# Patient Record
Sex: FEMALE | URBAN - METROPOLITAN AREA
[De-identification: names, ages, dates, MRNs, and addresses within clinical notes are randomized per-mention and may not be internally consistent; named-entity substitution may affect disease eponyms.]

---

## 2020-10-05 ENCOUNTER — APPOINTMENT (RX ONLY)
Dept: URBAN - METROPOLITAN AREA CLINIC 23 | Facility: CLINIC | Age: 55
Setting detail: DERMATOLOGY
End: 2020-10-05

## 2020-10-05 VITALS — TEMPERATURE: 97.7 F

## 2020-10-05 DIAGNOSIS — Z41.9 ENCOUNTER FOR PROCEDURE FOR PURPOSES OTHER THAN REMEDYING HEALTH STATE, UNSPECIFIED: ICD-10-CM

## 2020-10-05 PROCEDURE — ? RECOMMENDATIONS

## 2020-10-05 NOTE — PROCEDURE: RECOMMENDATIONS
Recommendations (Free Text): Picoway laser :$350 spots or full face $700\\nCore laser : $4000 full face or $3000 for lower face \\nEmatrix : $500 each treatment or $1250 for 3 treatments
Detail Level: Detailed
Recommendation Preamble: The following recommendations were made during the visit:

## 2023-07-14 ENCOUNTER — APPOINTMENT (RX ONLY)
Dept: URBAN - METROPOLITAN AREA CLINIC 23 | Facility: CLINIC | Age: 58
Setting detail: DERMATOLOGY
End: 2023-07-14

## 2023-07-14 DIAGNOSIS — L80 VITILIGO: ICD-10-CM

## 2023-07-14 DIAGNOSIS — D22 MELANOCYTIC NEVI: ICD-10-CM

## 2023-07-14 DIAGNOSIS — L82.1 OTHER SEBORRHEIC KERATOSIS: ICD-10-CM

## 2023-07-14 DIAGNOSIS — D49.2 NEOPLASM OF UNSPECIFIED BEHAVIOR OF BONE, SOFT TISSUE, AND SKIN: ICD-10-CM

## 2023-07-14 DIAGNOSIS — L57.0 ACTINIC KERATOSIS: ICD-10-CM

## 2023-07-14 PROBLEM — D22.5 MELANOCYTIC NEVI OF TRUNK: Status: ACTIVE | Noted: 2023-07-14

## 2023-07-14 PROCEDURE — 11102 TANGNTL BX SKIN SINGLE LES: CPT

## 2023-07-14 PROCEDURE — ? IN-HOUSE DISPENSING PHARMACY

## 2023-07-14 PROCEDURE — ? COUNSELING

## 2023-07-14 PROCEDURE — 99213 OFFICE O/P EST LOW 20 MIN: CPT | Mod: 25

## 2023-07-14 PROCEDURE — ? RECOMMENDATIONS

## 2023-07-14 PROCEDURE — ? DIAGNOSIS COMMENT

## 2023-07-14 PROCEDURE — ? BIOPSY BY SHAVE METHOD

## 2023-07-14 PROCEDURE — ? ORDER FOR PHOTODYNAMIC THERAPY

## 2023-07-14 ASSESSMENT — LOCATION SIMPLE DESCRIPTION DERM
LOCATION SIMPLE: RIGHT ANKLE
LOCATION SIMPLE: RIGHT UPPER BACK
LOCATION SIMPLE: CHEST
LOCATION SIMPLE: RIGHT FOREHEAD
LOCATION SIMPLE: LEFT UPPER BACK

## 2023-07-14 ASSESSMENT — LOCATION DETAILED DESCRIPTION DERM
LOCATION DETAILED: LEFT INFERIOR MEDIAL UPPER BACK
LOCATION DETAILED: RIGHT SUPERIOR MEDIAL UPPER BACK
LOCATION DETAILED: RIGHT ANTERIOR MEDIAL MALLEOLUS
LOCATION DETAILED: RIGHT INFERIOR MEDIAL FOREHEAD
LOCATION DETAILED: LOWER STERNUM

## 2023-07-14 ASSESSMENT — LOCATION ZONE DERM
LOCATION ZONE: TRUNK
LOCATION ZONE: LEG
LOCATION ZONE: FACE

## 2023-07-14 NOTE — PROCEDURE: IN-HOUSE DISPENSING PHARMACY
Product 49 Refills: 0
Product 69 Unit Type: mg
Product 2 Amount/Unit (Numbers Only): 1
Product 15 Unit Type: tablets
Product 6 Unit Type: grams
Render Product Pricing In Note: No
Name Of Product 20: Apply pea size amount to entire face at bedtime only at bedtime only.
Product 11 Application Directions: Apply to the affected area once daily as indicated
Name Of Product 16: Latisse
Name Of Product 7: Latisse 3ml
Name Of Product 1: Tri-Bozena cream
Product 11 Unit Type: jar(s)
Product 7 Application Directions: Apply to each eye lashes at bedtime
Product 16 Application Directions: Apply to lashes at bedtime only
Name Of Product 12: Skin Better Even tone Serum
Product 5 Application Directions: Take one tablet by mouth before  procedure
Name Of Product 10: Valtrex 500mg
Product 21 Unit Type: kit(s)
Product 12 Refills: 2
Name Of Product 4: Prednisone 20mg
Product 19 Application Directions: Tretinoin 0.05%
Product 10 Application Directions: Take two tablets after procedure
Name Of Product 15: Viviscal PRO
Name Of Product 6: Tretinoin 0.05% cream
Detail Level: Detailed
Product 19 Unit Type: tube(s)
Product 10 Unit Type: bottle(s)
Product 13 Price/Unit (In Dollars): $10.00
Product 15 Application Directions: Take one tablet twice daily for 3-6 months
Product 6 Application Directions: Apply pea size amount to entire face at bedtime only.
Name Of Product 11: Hydroquinone 8 % pads
Product 15 Amount/Unit (Numbers Only): [facility]
Product 6 Amount/Unit (Numbers Only): [address]
Product 13 Application Directions: Apply to wound site every 48 hours as indicated.
Name Of Product 18: 5FU/calcipotriene
Product 4 Application Directions: Take 1 tablet today in office post injections  for swelling as indicated and one tablet tomorrow as needed for swelling.
Name Of Product 9: Brightening pads 6%
Product 13 Unit Type: unit(s)
Name Of Product 3: Prednisone 10 mg
Product 18 Application Directions: Apply to pre-cancer daily for 5- 7 days
Name Of Product 21: Defenage skincare
Product 9 Application Directions: Apply to affected area on the face  pm as indicated.
Product 1 Price/Unit (In Dollars): 0.00
Name Of Product 5: Valium 10mg
Product 3 Application Directions: Take 1 tablet For swelling as indicated today prior to procedure,and another tomorrow
Product 16 Amount/Unit (Numbers Only): 3
Product 21 Application Directions: Use as directed
Product 14 Application Directions: Take one tablet prior to procedure. Continue twice daily x 3 days as indicated.
Product 16 Unit Type: ml
Product 1 Application Directions: Apply to entire face at bedtime as indicated.
Product 14 Amount/Unit (Numbers Only): [address]
Product 12 Application Directions: Apply to the entire face in the Am and Pm
Name Of Product 17: Christine Kemp
Name Of Product 8: Valium 5 mg
Product 17 Application Directions: Apply thin layer to face at bedtime only.
Product 8 Application Directions: Take 1 tablet prior to procedure. Dispense in office.
Name Of Product 13: Duoderm thin
Product 2 Application Directions: Apply a pea size amount to Entire face at bedtime

## 2023-07-14 NOTE — PROCEDURE: RECOMMENDATIONS
Render Risk Assessment In Note?: no
Recommendations (Free Text): Consider PDT blue light treatment within next 6 months.  Start Tretinoin to entire face
Detail Level: Detailed
Recommendation Preamble: The following recommendations were made at today?s visit :

## 2023-07-14 NOTE — PROCEDURE: BIOPSY BY SHAVE METHOD
Detail Level: Detailed
Depth Of Biopsy: dermis
Was A Bandage Applied: Yes
Size Of Lesion In Cm: 0
Biopsy Type: H and E
Biopsy Method: 15 blade
Anesthesia Type: 1% lidocaine with epinephrine and a 1:10 solution of 8.4% sodium bicarbonate
Anesthesia Volume In Cc (Will Not Render If 0): 0.2
Hemostasis: Electrocautery
Wound Care: Vaseline
Dressing: bandage
Destruction After The Procedure: No
Type Of Destruction Used: Curettage
Curettage Text: The wound bed was treated with curettage after the biopsy was performed.
Cryotherapy Text: The wound bed was treated with cryotherapy after the biopsy was performed.
Electrodesiccation Text: The wound bed was treated with electrodesiccation after the biopsy was performed.
Electrodesiccation And Curettage Text: The wound bed was treated with electrodesiccation and curettage after the biopsy was performed.
Silver Nitrate Text: The wound bed was treated with silver nitrate after the biopsy was performed.
Lab: -[id number]
Consent: The provider's intent is to obtain a tissue sample solely for diagnostic purposes. Written consent to obtain tissue sample was obtained and risks were reviewed including but not limited to scarring, infection, bleeding, scabbing, incomplete removal, nerve damage and allergy to anesthesia.
Post-Care Instructions: I reviewed with the patient in detail post-care instructions. Patient is to keep the biopsy site dry overnight, and then apply bacitracin twice daily until healed. Patient may apply hydrogen peroxide soaks to remove any crusting.
Notification Instructions: Patient will be notified of biopsy results. However, patient instructed to call the office if not contacted within 2 weeks.
Billing Type: [employer]
Information: Selecting Yes will display possible errors in your note based on the variables you have selected. This validation is only offered as a suggestion for you. PLEASE NOTE THAT THE VALIDATION TEXT WILL BE REMOVED WHEN YOU FINALIZE YOUR NOTE. IF YOU WANT TO FAX A PRELIMINARY NOTE YOU WILL NEED TO TOGGLE THIS TO 'NO' IF YOU DO NOT WANT IT IN YOUR FAXED NOTE.

## 2023-07-14 NOTE — PROCEDURE: ORDER FOR PHOTODYNAMIC THERAPY
Location: Face
Detail Level: Zone
Arms Incubation Time: 2 Hours
Occlusion: No
Scalp Incubation Time: 90 mins
Neck Incubation Time: 1 Hour
Pdt Type: LANCE-U
Photosensitizer: Levulan
Face Incubation Time: 1 Hour 30 minutes
Consent: The procedure and risks were reviewed with the patient including but not limited to: burning, pigmentary changes, pain, blistering, scabbing, redness, and the possibility of needing numerous treatments. Strict photoprotection after the procedure was also discussed.
Face And Scalp Incubation Time: 90 minutes for the face and 90 minutes for the scalp
Frequency Of Pdt: Single Treatment

## 2023-07-18 ENCOUNTER — RX ONLY (OUTPATIENT)
Age: 58
Setting detail: RX ONLY
End: 2023-07-18

## 2023-07-18 RX ORDER — TRIAMCINOLONE ACETONIDE 1 MG/G
OINTMENT TOPICAL
Qty: 15 | Refills: 0 | Status: ERX | COMMUNITY
Start: 2023-07-18